# Patient Record
Sex: MALE | Race: WHITE | NOT HISPANIC OR LATINO | Employment: OTHER | URBAN - METROPOLITAN AREA
[De-identification: names, ages, dates, MRNs, and addresses within clinical notes are randomized per-mention and may not be internally consistent; named-entity substitution may affect disease eponyms.]

---

## 2017-07-19 ENCOUNTER — PREPPED CHART (OUTPATIENT)
Dept: URBAN - METROPOLITAN AREA CLINIC 39 | Facility: CLINIC | Age: 72
End: 2017-07-19

## 2018-04-05 ENCOUNTER — ESTABLISHED PATIENT (OUTPATIENT)
Dept: URBAN - METROPOLITAN AREA CLINIC 39 | Facility: CLINIC | Age: 73
End: 2018-04-05

## 2018-04-05 DIAGNOSIS — H43.811: ICD-10-CM

## 2018-04-05 DIAGNOSIS — H35.371: ICD-10-CM

## 2018-04-05 DIAGNOSIS — H04.123: ICD-10-CM

## 2018-04-05 DIAGNOSIS — H25.812: ICD-10-CM

## 2018-04-05 DIAGNOSIS — H25.811: ICD-10-CM

## 2018-04-05 DIAGNOSIS — H18.59: ICD-10-CM

## 2018-04-05 PROCEDURE — G8785 BP SCRN NO PERF AT INTERVAL: HCPCS

## 2018-04-05 PROCEDURE — 92014 COMPRE OPH EXAM EST PT 1/>: CPT

## 2018-04-05 PROCEDURE — G8427 DOCREV CUR MEDS BY ELIG CLIN: HCPCS

## 2018-04-05 PROCEDURE — 1036F TOBACCO NON-USER: CPT

## 2018-04-05 PROCEDURE — 92015 DETERMINE REFRACTIVE STATE: CPT

## 2018-04-05 ASSESSMENT — VISUAL ACUITY
OS_SC: J1
OS_CC: J1
OS_CC: 20/25
OD_CC: 20/25
OD_SC: J1
OD_CC: J1
OD_SC: 20/400
OS_SC: 20/200

## 2018-04-05 ASSESSMENT — TONOMETRY
OS_IOP_MMHG: 10
OD_IOP_MMHG: 10

## 2018-05-09 ENCOUNTER — CONTACT LENS FOLLOW UP (OUTPATIENT)
Dept: URBAN - METROPOLITAN AREA CLINIC 39 | Facility: CLINIC | Age: 73
End: 2018-05-09

## 2018-05-09 DIAGNOSIS — H52.203: ICD-10-CM

## 2018-05-09 DIAGNOSIS — H52.13: ICD-10-CM

## 2018-05-09 PROCEDURE — 92310F

## 2018-05-09 ASSESSMENT — VISUAL ACUITY
OD_SC: 20/25
OS_SC: 20/30

## 2019-04-30 ENCOUNTER — RETINA CONSULT (OUTPATIENT)
Dept: URBAN - METROPOLITAN AREA CLINIC 39 | Facility: CLINIC | Age: 74
End: 2019-04-30

## 2019-04-30 ENCOUNTER — ESTABLISHED COMPREHENSIVE EXAM (OUTPATIENT)
Dept: URBAN - METROPOLITAN AREA CLINIC 39 | Facility: CLINIC | Age: 74
End: 2019-04-30

## 2019-04-30 VITALS — DIASTOLIC BLOOD PRESSURE: 80 MMHG | HEART RATE: 64 BPM | SYSTOLIC BLOOD PRESSURE: 116 MMHG | HEIGHT: 60 IN

## 2019-04-30 DIAGNOSIS — H33.311: ICD-10-CM

## 2019-04-30 DIAGNOSIS — H43.11: ICD-10-CM

## 2019-04-30 DIAGNOSIS — H43.813: ICD-10-CM

## 2019-04-30 DIAGNOSIS — H35.371: ICD-10-CM

## 2019-04-30 DIAGNOSIS — H43.391: ICD-10-CM

## 2019-04-30 DIAGNOSIS — H43.822: ICD-10-CM

## 2019-04-30 DIAGNOSIS — H43.811: ICD-10-CM

## 2019-04-30 DIAGNOSIS — H25.811: ICD-10-CM

## 2019-04-30 DIAGNOSIS — H33.011: ICD-10-CM

## 2019-04-30 DIAGNOSIS — H52.13: ICD-10-CM

## 2019-04-30 DIAGNOSIS — H25.812: ICD-10-CM

## 2019-04-30 DIAGNOSIS — H04.123: ICD-10-CM

## 2019-04-30 DIAGNOSIS — H18.59: ICD-10-CM

## 2019-04-30 DIAGNOSIS — H52.203: ICD-10-CM

## 2019-04-30 PROCEDURE — 92134 CPTRZ OPH DX IMG PST SGM RTA: CPT

## 2019-04-30 PROCEDURE — 67105 REPAIR DETACHED RETINA PC: CPT

## 2019-04-30 PROCEDURE — 92015 DETERMINE REFRACTIVE STATE: CPT

## 2019-04-30 PROCEDURE — 99214 OFFICE O/P EST MOD 30 MIN: CPT

## 2019-04-30 PROCEDURE — 92014 COMPRE OPH EXAM EST PT 1/>: CPT

## 2019-04-30 PROCEDURE — 9222550 BILAT EXTENDED OPHTHALMOSCOPY, FIRST

## 2019-04-30 PROCEDURE — 92310-1 LEVEL 1 CONTACT LENS MANAGEMENT

## 2019-04-30 ASSESSMENT — VISUAL ACUITY
OD_SC: J1
OS_SC: J1
OS_CC: 20/40+2
OS_BAT: 20/70
OD_BAT: 20/70
OD_CC: 20/40+1
OD_CC: J1
OS_SC: 20/100-1
OD_CC: J1
OS_CC: J1
OD_SC: 20/200
OU_CC: 20/40+2
OS_CC: 20/40+2
OS_CC: J1
OD_CC: 20/40+1

## 2019-04-30 ASSESSMENT — TONOMETRY
OS_IOP_MMHG: 8
OD_IOP_MMHG: 8
OS_IOP_MMHG: 08
OD_IOP_MMHG: 08

## 2019-04-30 ASSESSMENT — KERATOMETRY
OD_K2POWER_DIOPTERS: 46
OS_K1POWER_DIOPTERS: 43.5
OD_AXISANGLE2_DEGREES: 82
OS_AXISANGLE2_DEGREES: 91
OS_AXISANGLE_DEGREES: 1
OS_K2POWER_DIOPTERS: 46.75
OD_K1POWER_DIOPTERS: 44
OD_AXISANGLE_DEGREES: 172

## 2019-05-09 ENCOUNTER — DILATED FUNDUS EXAM (OUTPATIENT)
Dept: URBAN - METROPOLITAN AREA CLINIC 39 | Facility: CLINIC | Age: 74
End: 2019-05-09

## 2019-05-09 DIAGNOSIS — H33.311: ICD-10-CM

## 2019-05-09 DIAGNOSIS — H33.011: ICD-10-CM

## 2019-05-09 PROCEDURE — 92012 INTRM OPH EXAM EST PATIENT: CPT

## 2019-05-09 ASSESSMENT — TONOMETRY
OS_IOP_MMHG: 08
OD_IOP_MMHG: 10

## 2019-05-09 ASSESSMENT — VISUAL ACUITY
OD_SC: 20/25-2
OS_SC: 20/25-1

## 2019-07-09 ENCOUNTER — DILATED FUNDUS EXAM (OUTPATIENT)
Dept: URBAN - METROPOLITAN AREA CLINIC 39 | Facility: CLINIC | Age: 74
End: 2019-07-09

## 2019-07-09 DIAGNOSIS — H43.391: ICD-10-CM

## 2019-07-09 DIAGNOSIS — H33.011: ICD-10-CM

## 2019-07-09 DIAGNOSIS — H43.811: ICD-10-CM

## 2019-07-09 DIAGNOSIS — H35.371: ICD-10-CM

## 2019-07-09 DIAGNOSIS — H35.363: ICD-10-CM

## 2019-07-09 DIAGNOSIS — H33.311: ICD-10-CM

## 2019-07-09 DIAGNOSIS — H43.822: ICD-10-CM

## 2019-07-09 PROCEDURE — 9222650 BILAT EXTENDED OPHTHALMOSCOPY, F/U

## 2019-07-09 PROCEDURE — 92134 CPTRZ OPH DX IMG PST SGM RTA: CPT

## 2019-07-09 PROCEDURE — 92012 INTRM OPH EXAM EST PATIENT: CPT

## 2019-07-09 ASSESSMENT — VISUAL ACUITY
OD_CC: 20/25-2
OS_CC: 20/25-2

## 2019-07-09 ASSESSMENT — TONOMETRY
OS_IOP_MMHG: 10
OD_IOP_MMHG: 11

## 2019-07-29 ENCOUNTER — CATARACT CONSULT (OUTPATIENT)
Dept: URBAN - METROPOLITAN AREA CLINIC 39 | Facility: CLINIC | Age: 74
End: 2019-07-29

## 2019-07-29 DIAGNOSIS — H43.391: ICD-10-CM

## 2019-07-29 DIAGNOSIS — H33.311: ICD-10-CM

## 2019-07-29 DIAGNOSIS — H25.811: ICD-10-CM

## 2019-07-29 DIAGNOSIS — H35.371: ICD-10-CM

## 2019-07-29 DIAGNOSIS — H25.812: ICD-10-CM

## 2019-07-29 DIAGNOSIS — H43.811: ICD-10-CM

## 2019-07-29 DIAGNOSIS — H33.011: ICD-10-CM

## 2019-07-29 DIAGNOSIS — H43.822: ICD-10-CM

## 2019-07-29 PROCEDURE — 92025-2 CORNEAL TOPOGRAPHY, PT

## 2019-07-29 PROCEDURE — 92134 CPTRZ OPH DX IMG PST SGM RTA: CPT

## 2019-07-29 PROCEDURE — 92136TC INTERFEROMETRY - TECHNICAL COMPONENT

## 2019-07-29 PROCEDURE — 92014 COMPRE OPH EXAM EST PT 1/>: CPT

## 2019-07-29 PROCEDURE — V2799PMN IMPRIMIS PRED-MOXI-NEPAF 5ML

## 2019-07-29 ASSESSMENT — VISUAL ACUITY
OD_SC: J1+
OD_CC: 20/30
OD_BAT: 20/60
OS_BAT: 20/50
OS_SC: 20/400
OS_SC: J2
OD_CC: J1
OD_RAM: 20/20-2
OS_CC: 20/30
OD_SC: 20/400
OS_CC: J1
OS_AM: 20/20

## 2019-07-29 ASSESSMENT — TONOMETRY
OS_IOP_MMHG: 09
OD_IOP_MMHG: 10

## 2019-09-23 ENCOUNTER — SURGERY/PROCEDURE (OUTPATIENT)
Dept: URBAN - METROPOLITAN AREA CLINIC 39 | Facility: CLINIC | Age: 74
End: 2019-09-23

## 2019-09-23 ENCOUNTER — PRE-OP/H&P (OUTPATIENT)
Dept: URBAN - METROPOLITAN AREA CLINIC 39 | Facility: CLINIC | Age: 74
End: 2019-09-23

## 2019-09-23 DIAGNOSIS — H35.371: ICD-10-CM

## 2019-09-23 DIAGNOSIS — H25.811: ICD-10-CM

## 2019-09-23 DIAGNOSIS — H33.011: ICD-10-CM

## 2019-09-23 DIAGNOSIS — H43.811: ICD-10-CM

## 2019-09-23 DIAGNOSIS — H25.812: ICD-10-CM

## 2019-09-23 DIAGNOSIS — H33.311: ICD-10-CM

## 2019-09-23 DIAGNOSIS — H43.391: ICD-10-CM

## 2019-09-23 PROCEDURE — 66984AV REMOVE CATARACT, INSERT ADVANCED LENS

## 2019-09-23 PROCEDURE — 99211T TECH SERVICE

## 2019-09-23 PROCEDURE — 66999LNSR LENSAR LASER FOR CAT SX

## 2019-09-24 ENCOUNTER — POST OP/EVAL OF SECOND EYE (OUTPATIENT)
Dept: URBAN - METROPOLITAN AREA CLINIC 39 | Facility: CLINIC | Age: 74
End: 2019-09-24

## 2019-09-24 DIAGNOSIS — Z96.1: ICD-10-CM

## 2019-09-24 DIAGNOSIS — H25.811: ICD-10-CM

## 2019-09-24 PROCEDURE — 92012 INTRM OPH EXAM EST PATIENT: CPT

## 2019-09-24 PROCEDURE — 99024 POSTOP FOLLOW-UP VISIT: CPT

## 2019-09-24 ASSESSMENT — VISUAL ACUITY
OS_SC: 20/25-2
OS_SC: J3
OD_BAT: 20/60

## 2019-09-24 ASSESSMENT — TONOMETRY
OS_IOP_MMHG: 11
OD_IOP_MMHG: 10

## 2019-09-30 ENCOUNTER — SURGERY/PROCEDURE (OUTPATIENT)
Dept: URBAN - METROPOLITAN AREA CLINIC 39 | Facility: CLINIC | Age: 74
End: 2019-09-30

## 2019-09-30 ENCOUNTER — PRE-OP/H&P (OUTPATIENT)
Dept: URBAN - METROPOLITAN AREA CLINIC 39 | Facility: CLINIC | Age: 74
End: 2019-09-30

## 2019-09-30 DIAGNOSIS — H26.492: ICD-10-CM

## 2019-09-30 DIAGNOSIS — H43.811: ICD-10-CM

## 2019-09-30 DIAGNOSIS — H35.371: ICD-10-CM

## 2019-09-30 DIAGNOSIS — H25.811: ICD-10-CM

## 2019-09-30 DIAGNOSIS — H43.391: ICD-10-CM

## 2019-09-30 PROCEDURE — 65772LRI LRI DURING CAT SX

## 2019-09-30 PROCEDURE — 99211T TECH SERVICE

## 2019-09-30 PROCEDURE — 66984AV REMOVE CATARACT, INSERT ADVANCED LENS

## 2019-09-30 PROCEDURE — 66999LNSR LENSAR LASER FOR CAT SX

## 2019-09-30 ASSESSMENT — VISUAL ACUITY
OD_SC: 20/400
OS_SC: 20/25-1
OS_SC: J4 @ 23"
OD_SC: J1

## 2019-09-30 ASSESSMENT — TONOMETRY
OS_IOP_MMHG: 09
OD_IOP_MMHG: 08

## 2019-10-01 ENCOUNTER — CATARACT POST-OP 1-DAY (OUTPATIENT)
Dept: URBAN - METROPOLITAN AREA CLINIC 39 | Facility: CLINIC | Age: 74
End: 2019-10-01

## 2019-10-01 DIAGNOSIS — Z96.1: ICD-10-CM

## 2019-10-01 PROCEDURE — 99024 POSTOP FOLLOW-UP VISIT: CPT

## 2019-10-01 ASSESSMENT — TONOMETRY
OD_IOP_MMHG: 15
OS_IOP_MMHG: 11

## 2019-10-01 ASSESSMENT — VISUAL ACUITY
OU_SC: 20/25+2
OU_SC: J2
OS_SC: 20/20-2
OS_SC: J3
OD_SC: 20/40
OD_SC: J5

## 2019-10-31 ENCOUNTER — POST-OP CATARACT (OUTPATIENT)
Dept: URBAN - METROPOLITAN AREA CLINIC 39 | Facility: CLINIC | Age: 74
End: 2019-10-31

## 2019-10-31 DIAGNOSIS — Z96.1: ICD-10-CM

## 2019-10-31 PROCEDURE — 99024 POSTOP FOLLOW-UP VISIT: CPT

## 2019-10-31 ASSESSMENT — VISUAL ACUITY
OU_SC: J2
OS_SC: J3
OD_BAT: 20/40 W/MR
OD_SC: 20/25-1
OU_SC: 20/20-2
OD_SC: J3
OS_SC: 20/20-2
OS_BAT: 20/25 W/MR

## 2019-10-31 ASSESSMENT — TONOMETRY
OD_IOP_MMHG: 13
OS_IOP_MMHG: 12

## 2019-12-05 ENCOUNTER — POST-OP CATARACT (OUTPATIENT)
Dept: URBAN - METROPOLITAN AREA CLINIC 39 | Facility: CLINIC | Age: 74
End: 2019-12-05

## 2019-12-05 DIAGNOSIS — H26.491: ICD-10-CM

## 2019-12-05 DIAGNOSIS — Z96.1: ICD-10-CM

## 2019-12-05 DIAGNOSIS — H26.492: ICD-10-CM

## 2019-12-05 PROCEDURE — 99024 POSTOP FOLLOW-UP VISIT: CPT

## 2019-12-05 ASSESSMENT — VISUAL ACUITY
OS_BAT: 20/25 W/MR
OD_SC: J2
OS_SC: J2
OU_SC: J1
OS_SC: 20/20-1
OU_SC: 20/20-1
OD_SC: 20/25
OD_BAT: <20/400 W/MR

## 2019-12-05 ASSESSMENT — TONOMETRY
OD_IOP_MMHG: 11
OS_IOP_MMHG: 10

## 2020-01-10 ENCOUNTER — ESTABLISHED PATIENT (OUTPATIENT)
Dept: URBAN - METROPOLITAN AREA CLINIC 39 | Facility: CLINIC | Age: 75
End: 2020-01-10

## 2020-01-10 ENCOUNTER — SURGERY/PROCEDURE (OUTPATIENT)
Dept: URBAN - METROPOLITAN AREA SURGERY 14 | Facility: SURGERY | Age: 75
End: 2020-01-10

## 2020-01-10 DIAGNOSIS — H35.363: ICD-10-CM

## 2020-01-10 DIAGNOSIS — H35.371: ICD-10-CM

## 2020-01-10 DIAGNOSIS — H26.492: ICD-10-CM

## 2020-01-10 PROCEDURE — 92012 INTRM OPH EXAM EST PATIENT: CPT

## 2020-01-10 PROCEDURE — 66821 AFTER CATARACT LASER SURGERY: CPT

## 2020-01-10 PROCEDURE — 92134 CPTRZ OPH DX IMG PST SGM RTA: CPT

## 2020-01-10 ASSESSMENT — VISUAL ACUITY
OD_SC: 20/30-1
OS_BAT: 20/60
OD_BAT: 20/60
OD_SC: J2-
OS_SC: 20/25+2
OS_SC: J2

## 2020-01-10 ASSESSMENT — TONOMETRY
OS_IOP_MMHG: 10
OD_IOP_MMHG: 10

## 2020-01-17 ENCOUNTER — YAG POST-OP (OUTPATIENT)
Dept: URBAN - METROPOLITAN AREA CLINIC 39 | Facility: CLINIC | Age: 75
End: 2020-01-17

## 2020-01-17 DIAGNOSIS — Z98.890: ICD-10-CM

## 2020-01-17 PROCEDURE — 99024 POSTOP FOLLOW-UP VISIT: CPT

## 2020-01-17 ASSESSMENT — TONOMETRY
OS_IOP_MMHG: 08
OD_IOP_MMHG: 07

## 2020-01-17 ASSESSMENT — VISUAL ACUITY
OD_SC: 20/20-1
OS_SC: J1-
OD_SC: J2-
OS_SC: 20/20

## 2020-01-22 ENCOUNTER — SURGERY/PROCEDURE (OUTPATIENT)
Dept: URBAN - METROPOLITAN AREA CLINIC 39 | Facility: CLINIC | Age: 75
End: 2020-01-22

## 2020-01-22 ENCOUNTER — PRE-OP/H&P (OUTPATIENT)
Dept: URBAN - METROPOLITAN AREA CLINIC 39 | Facility: CLINIC | Age: 75
End: 2020-01-22

## 2020-01-22 DIAGNOSIS — T85.29XA: ICD-10-CM

## 2020-01-22 PROCEDURE — 99211T TECH SERVICE

## 2020-01-22 PROCEDURE — 66986 EXCHANGE LENS PROSTHESIS: CPT

## 2020-01-23 ENCOUNTER — CATARACT POST-OP 1-DAY (OUTPATIENT)
Dept: URBAN - METROPOLITAN AREA CLINIC 39 | Facility: CLINIC | Age: 75
End: 2020-01-23

## 2020-01-23 DIAGNOSIS — Z98.890: ICD-10-CM

## 2020-01-23 PROCEDURE — 99024 POSTOP FOLLOW-UP VISIT: CPT

## 2020-01-23 ASSESSMENT — VISUAL ACUITY
OD_SC: J10
OD_PH: 20/40-1
OD_SC: 20/70

## 2020-01-23 ASSESSMENT — TONOMETRY
OD_IOP_MMHG: 09
OS_IOP_MMHG: 09

## 2020-02-06 ENCOUNTER — EST. PATIENT EMERGENCY (OUTPATIENT)
Dept: URBAN - METROPOLITAN AREA CLINIC 39 | Facility: CLINIC | Age: 75
End: 2020-02-06

## 2020-02-06 DIAGNOSIS — Z98.890: ICD-10-CM

## 2020-02-06 DIAGNOSIS — H26.491: ICD-10-CM

## 2020-02-06 PROCEDURE — 99024 POSTOP FOLLOW-UP VISIT: CPT

## 2020-02-06 ASSESSMENT — VISUAL ACUITY
OS_SC: 20/20
OD_SC: J8
OD_SC: 20/30+2
OS_SC: J1
OD_BAT: <20/400 W/ MR

## 2020-02-06 ASSESSMENT — TONOMETRY
OS_IOP_MMHG: 08
OD_IOP_MMHG: 08

## 2020-02-18 ENCOUNTER — POST-OP CATARACT (OUTPATIENT)
Dept: URBAN - METROPOLITAN AREA CLINIC 39 | Facility: CLINIC | Age: 75
End: 2020-02-18

## 2020-02-18 DIAGNOSIS — H26.491: ICD-10-CM

## 2020-02-18 DIAGNOSIS — Z98.890: ICD-10-CM

## 2020-02-18 PROCEDURE — 99024 POSTOP FOLLOW-UP VISIT: CPT

## 2020-02-18 ASSESSMENT — VISUAL ACUITY
OU_SC: J2
OS_SC: J2
OD_CC: J1+
OD_SC: J3
OU_SC: 20/20
OS_SC: 20/20
OD_BAT: 20/70-2
OD_SC: 20/30
OS_CC: J1+

## 2020-02-18 ASSESSMENT — TONOMETRY
OD_IOP_MMHG: 10
OS_IOP_MMHG: 10

## 2020-02-25 ENCOUNTER — SURGERY/PROCEDURE (OUTPATIENT)
Dept: URBAN - METROPOLITAN AREA SURGERY 14 | Facility: SURGERY | Age: 75
End: 2020-02-25

## 2020-02-25 ENCOUNTER — YAG EVALUATION (OUTPATIENT)
Dept: URBAN - METROPOLITAN AREA CLINIC 39 | Facility: CLINIC | Age: 75
End: 2020-02-25

## 2020-02-25 DIAGNOSIS — H26.491: ICD-10-CM

## 2020-02-25 PROCEDURE — 92014 COMPRE OPH EXAM EST PT 1/>: CPT

## 2020-02-25 PROCEDURE — 66821 AFTER CATARACT LASER SURGERY: CPT

## 2020-02-25 ASSESSMENT — VISUAL ACUITY
OD_SC: 20/25-2
OS_SC: 20/20
OD_BAT: 20/70-2

## 2020-02-25 ASSESSMENT — TONOMETRY
OS_IOP_MMHG: 10
OD_IOP_MMHG: 10

## 2020-03-05 ENCOUNTER — YAG POST-OP (OUTPATIENT)
Dept: URBAN - METROPOLITAN AREA CLINIC 39 | Facility: CLINIC | Age: 75
End: 2020-03-05

## 2020-03-05 DIAGNOSIS — Z96.1: ICD-10-CM

## 2020-03-05 PROCEDURE — 99024 POSTOP FOLLOW-UP VISIT: CPT

## 2020-03-05 ASSESSMENT — VISUAL ACUITY
OS_SC: J1-
OD_SC: J3-
OS_CC: J1+
OD_CC: J1+
OS_SC: 20/20-1
OD_SC: 20/25+1

## 2020-03-05 ASSESSMENT — TONOMETRY
OD_IOP_MMHG: 07
OS_IOP_MMHG: 07

## 2020-03-16 ENCOUNTER — YAG POST-OP (OUTPATIENT)
Dept: URBAN - METROPOLITAN AREA CLINIC 39 | Facility: CLINIC | Age: 75
End: 2020-03-16

## 2020-03-16 DIAGNOSIS — Z98.890: ICD-10-CM

## 2020-03-16 DIAGNOSIS — H35.371: ICD-10-CM

## 2020-03-16 DIAGNOSIS — H35.363: ICD-10-CM

## 2020-03-16 PROCEDURE — 99024 POSTOP FOLLOW-UP VISIT: CPT

## 2020-03-16 PROCEDURE — 92134 CPTRZ OPH DX IMG PST SGM RTA: CPT

## 2020-03-16 ASSESSMENT — VISUAL ACUITY
OS_SC: J1-
OD_SC: 20/25+2
OD_SC: J3-
OS_SC: 20/20

## 2020-03-16 ASSESSMENT — TONOMETRY
OS_IOP_MMHG: 10
OD_IOP_MMHG: 10

## 2020-03-17 ENCOUNTER — POST-OP (OUTPATIENT)
Dept: URBAN - METROPOLITAN AREA CLINIC 39 | Facility: CLINIC | Age: 75
End: 2020-03-17

## 2020-03-17 DIAGNOSIS — H04.123: ICD-10-CM

## 2020-03-17 DIAGNOSIS — Z98.890: ICD-10-CM

## 2020-03-17 PROCEDURE — 68761Q PUNCTAL PLUG/QUINTESS DISSOLVABLE PLUG/EACH

## 2020-03-17 PROCEDURE — 99024 POSTOP FOLLOW-UP VISIT: CPT

## 2020-03-17 PROCEDURE — A4262 TEMPORARY TEAR DUCT PLUG: HCPCS

## 2020-03-17 ASSESSMENT — VISUAL ACUITY
OS_SC: 20/20
OD_SC: 20/25-1

## 2020-03-25 ENCOUNTER — POST-OP (OUTPATIENT)
Dept: URBAN - METROPOLITAN AREA CLINIC 39 | Facility: CLINIC | Age: 75
End: 2020-03-25

## 2020-03-25 DIAGNOSIS — H04.123: ICD-10-CM

## 2020-03-25 DIAGNOSIS — H35.371: ICD-10-CM

## 2020-03-25 DIAGNOSIS — H52.7: ICD-10-CM

## 2020-03-25 PROCEDURE — 99024 POSTOP FOLLOW-UP VISIT: CPT

## 2020-03-25 ASSESSMENT — VISUAL ACUITY
OS_SC: J2
OU_SC: J1
OU_SC: 20/20
OD_SC: J2
OS_SC: 20/20
OD_SC: 20/25+1

## 2020-03-25 ASSESSMENT — TONOMETRY
OD_IOP_MMHG: 11
OS_IOP_MMHG: 10

## 2020-06-10 ENCOUNTER — ESTABLISHED COMPREHENSIVE EXAM (OUTPATIENT)
Dept: URBAN - METROPOLITAN AREA CLINIC 39 | Facility: CLINIC | Age: 75
End: 2020-06-10

## 2020-06-10 DIAGNOSIS — H18.59: ICD-10-CM

## 2020-06-10 DIAGNOSIS — H52.13: ICD-10-CM

## 2020-06-10 DIAGNOSIS — H43.822: ICD-10-CM

## 2020-06-10 DIAGNOSIS — H43.811: ICD-10-CM

## 2020-06-10 DIAGNOSIS — H43.391: ICD-10-CM

## 2020-06-10 DIAGNOSIS — H35.371: ICD-10-CM

## 2020-06-10 DIAGNOSIS — H35.363: ICD-10-CM

## 2020-06-10 DIAGNOSIS — H52.7: ICD-10-CM

## 2020-06-10 DIAGNOSIS — H52.223: ICD-10-CM

## 2020-06-10 DIAGNOSIS — H04.123: ICD-10-CM

## 2020-06-10 PROCEDURE — 92014 COMPRE OPH EXAM EST PT 1/>: CPT

## 2020-06-10 PROCEDURE — 92015 DETERMINE REFRACTIVE STATE: CPT

## 2020-06-10 ASSESSMENT — VISUAL ACUITY
OD_SC: 20/25-1
OD_SC: J3
OD_CC: J1+
OS_CC: J1+
OS_SC: J3
OU_SC: 20/20-1
OS_SC: 20/20-1

## 2020-06-10 ASSESSMENT — TONOMETRY
OS_IOP_MMHG: 11
OD_IOP_MMHG: 12

## 2020-06-17 ENCOUNTER — PRE-OP/H&P (OUTPATIENT)
Dept: URBAN - METROPOLITAN AREA CLINIC 39 | Facility: CLINIC | Age: 75
End: 2020-06-17

## 2020-06-17 ENCOUNTER — SURGERY/PROCEDURE (OUTPATIENT)
Dept: URBAN - METROPOLITAN AREA CLINIC 39 | Facility: CLINIC | Age: 75
End: 2020-06-17

## 2020-06-17 DIAGNOSIS — H43.811: ICD-10-CM

## 2020-06-17 DIAGNOSIS — H04.123: ICD-10-CM

## 2020-06-17 DIAGNOSIS — H35.363: ICD-10-CM

## 2020-06-17 DIAGNOSIS — H18.59: ICD-10-CM

## 2020-06-17 DIAGNOSIS — H35.371: ICD-10-CM

## 2020-06-17 DIAGNOSIS — H43.391: ICD-10-CM

## 2020-06-17 DIAGNOSIS — H52.7: ICD-10-CM

## 2020-06-17 DIAGNOSIS — H43.822: ICD-10-CM

## 2020-06-17 PROCEDURE — 99211T TECH SERVICE

## 2020-06-17 PROCEDURE — 66999LNSR LENSAR LASER FOR CAT SX

## 2020-06-17 PROCEDURE — 65772 CORRECTION OF ASTIGMATISM: CPT

## 2020-06-18 ENCOUNTER — CATARACT POST-OP 1-DAY (OUTPATIENT)
Dept: URBAN - METROPOLITAN AREA CLINIC 39 | Facility: CLINIC | Age: 75
End: 2020-06-18

## 2020-06-18 DIAGNOSIS — Z98.890: ICD-10-CM

## 2020-06-18 PROCEDURE — 99024 POSTOP FOLLOW-UP VISIT: CPT

## 2020-06-18 ASSESSMENT — TONOMETRY
OD_IOP_MMHG: 10
OS_IOP_MMHG: 10

## 2020-06-18 ASSESSMENT — VISUAL ACUITY
OD_SC: J3
OD_SC: 20/20-2

## 2020-06-25 NOTE — PATIENT DISCUSSION
Dry Eyes OU:  Increase PF artificials tears. Encouraged regular use. PF Retain MGD q1h wa ou. Pt taking meds that are severely drying. Need to get tear film better if wants to proceed with surgery.

## 2020-06-25 NOTE — PATIENT DISCUSSION
1.  Cataract OD: Discussed the risks benefits alternatives and limitations of cataract surgery including infection bleeding loss of vision retinal tears detachment. The patient stated a full understanding and a desire to proceed with the procedure in the right eye. Refractive options were reviewed. Patient has elected to be optimized for near vision in the right eye. The patient will need glasses for distance. Schedule KPE/IOL OD. Pore -2.252. Pseudophakia OS - IOL stable. Monitor for changes in vision. 3.  Dry Eyes OU:  Increase PF artificials tears. Encouraged regular use. PF Retain MGD q1h wa ou. Pt taking meds that are severely drying. Need to get tear film better if wants to proceed with surgery. 4. Return for an appointment in 2 weeks for office call. with Dr. Kendy Roy.

## 2020-07-09 ENCOUNTER — POST-OP (OUTPATIENT)
Dept: URBAN - METROPOLITAN AREA CLINIC 39 | Facility: CLINIC | Age: 75
End: 2020-07-09

## 2020-07-09 DIAGNOSIS — Z98.890: ICD-10-CM

## 2020-07-09 PROCEDURE — 99024 POSTOP FOLLOW-UP VISIT: CPT

## 2020-07-09 ASSESSMENT — TONOMETRY
OD_IOP_MMHG: 6
OS_IOP_MMHG: 6

## 2020-07-09 ASSESSMENT — VISUAL ACUITY
OD_SC: J2
OD_SC: 20/30+2

## 2020-10-15 NOTE — PATIENT DISCUSSION
1.  Dry Eye OU: Much improved but still symptomatic Continue current management with Artificial Tears. Discussed option of TPP risks and benefits reviewed. Plan TPP today2. Combined Types of Cataract OD: Discussed the risks benefits alternatives and limitations of cataract surgery including infection bleeding loss of vision retinal tears detachment. The patient stated a full understanding and a desire to near  vision . Schedule KPE/IOL 00 PORE. -2.00. Not a candidate for MFL due to dryness ou.3. Informed consent obtainedCollagen Plug placed in bilateral lower lids without complication. .3mm extended duration collagen plugs RLL and LLL. 4. Pseudophakia OS - IOL stable. Monitor for changes in vision. 5. Return for an appointment for [address] [place] [address] with Dr. Reilly Poole.

## 2020-10-15 NOTE — PATIENT DISCUSSION
Informed consent obtainedCollagen Plug placed in bilateral lower lids without complication. .3mm extended duration collagen plugs RLL and LLL.

## 2020-10-15 NOTE — PATIENT DISCUSSION
Combined Types of Cataract OD: Discussed the risks benefits alternatives and limitations of cataract surgery including infection bleeding loss of vision retinal tears detachment. The patient stated a full understanding and a desire to near  vision . Schedule KPE/IOL 00 PORE. -2.00. Not a candidate for MFL due to dryness ou.

## 2021-01-12 ENCOUNTER — ESTABLISHED COMPREHENSIVE EXAM (OUTPATIENT)
Dept: URBAN - METROPOLITAN AREA CLINIC 39 | Facility: CLINIC | Age: 76
End: 2021-01-12

## 2021-01-12 DIAGNOSIS — H43.391: ICD-10-CM

## 2021-01-12 DIAGNOSIS — H52.13: ICD-10-CM

## 2021-01-12 DIAGNOSIS — H43.811: ICD-10-CM

## 2021-01-12 DIAGNOSIS — H04.123: ICD-10-CM

## 2021-01-12 DIAGNOSIS — H35.371: ICD-10-CM

## 2021-01-12 DIAGNOSIS — Z96.1: ICD-10-CM

## 2021-01-12 DIAGNOSIS — H52.223: ICD-10-CM

## 2021-01-12 DIAGNOSIS — H43.822: ICD-10-CM

## 2021-01-12 DIAGNOSIS — H18.593: ICD-10-CM

## 2021-01-12 DIAGNOSIS — H35.363: ICD-10-CM

## 2021-01-12 PROCEDURE — 92014 COMPRE OPH EXAM EST PT 1/>: CPT

## 2021-01-12 PROCEDURE — 92499OP2 OPTOMAP RETINAL SCREENING BOTH EYES

## 2021-01-12 PROCEDURE — 92015 DETERMINE REFRACTIVE STATE: CPT

## 2021-01-12 ASSESSMENT — VISUAL ACUITY
OD_SC: J3
OS_CC: J1+
OD_CC: J1+
OU_SC: J2
OS_SC: J2
OS_SC: 20/20
OD_SC: 20/20-2

## 2021-01-12 ASSESSMENT — KERATOMETRY
OD_K2POWER_DIOPTERS: 45.25
OD_AXISANGLE_DEGREES: 178
OS_AXISANGLE2_DEGREES: 93
OS_K2POWER_DIOPTERS: 47.00
OS_AXISANGLE_DEGREES: 3
OS_K1POWER_DIOPTERS: 43.50
OD_AXISANGLE2_DEGREES: 88
OD_K1POWER_DIOPTERS: 44.50

## 2021-01-12 ASSESSMENT — TONOMETRY
OD_IOP_MMHG: 8
OS_IOP_MMHG: 7

## 2021-01-21 NOTE — PATIENT DISCUSSION
1.  DRy eye better with TPP now gone. Option of PPP discussed. Risks and benefits of punctal plugs reviewed with patient. Recommend permantnt punctal plugs and continued topical therapy2. Informed consent obtainedPermanent Plug placed in bilateral lower lids without complication3. Pseudophakia OS - IOL stable. Monitor for changes in vision. 4. Combined Types of Cataract OD: Discussed the risks benefits alternatives and limitations of cataract surgery including infection bleeding loss of vision retinal tears detachment. Pt defers for now reevaluate next visitReturn for an appointment in 6 months for cataract evaluation. with Dr. Maria Ines Johns.

## 2021-01-21 NOTE — PATIENT DISCUSSION
Combined Types of Cataract OD: Discussed the risks benefits alternatives and limitations of cataract surgery including infection bleeding loss of vision retinal tears detachment. Pt defers for now reevaluate next visitReturn for an appointment in 6 months for cataract evaluation. with Dr. Nani Rodriguez.

## 2021-06-09 ENCOUNTER — ESTABLISHED COMPREHENSIVE EXAM (OUTPATIENT)
Dept: URBAN - METROPOLITAN AREA CLINIC 39 | Facility: CLINIC | Age: 76
End: 2021-06-09

## 2021-06-09 DIAGNOSIS — H52.223: ICD-10-CM

## 2021-06-09 DIAGNOSIS — H43.391: ICD-10-CM

## 2021-06-09 DIAGNOSIS — H35.371: ICD-10-CM

## 2021-06-09 DIAGNOSIS — H52.13: ICD-10-CM

## 2021-06-09 DIAGNOSIS — H43.822: ICD-10-CM

## 2021-06-09 DIAGNOSIS — H43.811: ICD-10-CM

## 2021-06-09 DIAGNOSIS — H35.363: ICD-10-CM

## 2021-06-09 DIAGNOSIS — H04.123: ICD-10-CM

## 2021-06-09 DIAGNOSIS — H18.593: ICD-10-CM

## 2021-06-09 PROCEDURE — 92499OP2 OPTOMAP RETINAL SCREENING BOTH EYES

## 2021-06-09 PROCEDURE — 92015 DETERMINE REFRACTIVE STATE: CPT

## 2021-06-09 PROCEDURE — 92014 COMPRE OPH EXAM EST PT 1/>: CPT

## 2021-06-09 ASSESSMENT — KERATOMETRY
OD_AXISANGLE_DEGREES: 177
OD_K2POWER_DIOPTERS: 45.50
OD_K1POWER_DIOPTERS: 44.75
OS_AXISANGLE2_DEGREES: 91
OS_AXISANGLE_DEGREES: 1
OS_K1POWER_DIOPTERS: 43.75
OD_AXISANGLE2_DEGREES: 87
OS_K2POWER_DIOPTERS: 47.50

## 2021-06-09 ASSESSMENT — VISUAL ACUITY
OD_SC: 20/20-2
OU_SC: J2
OS_SC: J2
OU_SC: 20/20
OS_SC: 20/20
OD_SC: J3

## 2021-06-09 ASSESSMENT — TONOMETRY
OS_IOP_MMHG: 7
OD_IOP_MMHG: 8

## 2021-06-22 NOTE — PATIENT DISCUSSION
Post-Op Day #1 - Cataract Surgery Right Eye (OD) - doing well. Tears prn. Continue postop drops as directed. Call office with symptoms of pain redness or decreased vision in operative eye. 1 drop tobramycin instilled. Patient to come back in 1 week PO/MRX with Dr Laura Gonzales

## 2021-06-28 NOTE — PATIENT DISCUSSION
1.  DRyness OU--severe--no cls--start working on tears PF q3h and giovana qhs.  2.  RTN 3 weeks PO--FU on dryness before fitting with cls.

## 2021-07-21 NOTE — PATIENT DISCUSSION
1.  Good fit with [employer]cker Ax so can order. Can order [employer] as well. 2.   Return for an appointment in 3 months for cataract evaluation with Dr. Iona Vieira

## 2022-03-01 NOTE — PATIENT DISCUSSION
1.  Corneal abrasion OS (vs neurotrophic ulcer?) - 8.8 AV Oasys plano BCL placed without incident. 8.4 was too tight. Besivance instilled first. To not wear any other CLs until healed. Start Polytrim QID OS. If poor healing consider Oxervate after checking corneal sensitivity at next visit. 2. Keratoconjunctivitis Sicca OU:  Continue current management. 3.  Pseudophakia OU - IOLs stable. Monitor for changes in vision. 4. Return for an appointment in 3 days for office call. with Dr. Serge Mckee.

## 2022-03-04 NOTE — PATIENT DISCUSSION
1.  Corneal abrasion OS - Improved significantly. Trace defect and slight discomfort when BCL removed. Can stop Polytrim gtt OS. New 8.8 Oasys plano BCL replaced to wear X 3 D then can remove. 2. Keratoconjunctivitis Sicca OU:  Continue current management. 3.  Pseudophakia OU - IOLs stable. Monitor for changes in vision.

## 2022-03-11 NOTE — PATIENT DISCUSSION
1.  Corneal abrasion OS - Healed2. Keratoconjunctivitis Sicca OU: Advised may not be able to continue CLs because of dry eyes. Will order trial Oasys torics to see if can remove easier than present Ultra torics that she struggles to remove. Has narrow aperture. Understands that colored CLs will not give as sharp vision and may feel drier. To limit wear to no more than 2 hours and to use rewetting gtt. Start RegenerEyes Lite TID OU and will recheck in 6 weeks at which time will also try the AV Oasys torics. 3. Pseudophakia OU - IOLs stable. Monitor for changes in vision.

## 2022-03-22 NOTE — PATIENT DISCUSSION
Good fit VA comfort with new Oasys CLs. Pt also happy that much easier to insert and remove. Dispense as DW X 2W. Can order if happy. Continue with RegenerEyes Lite and follow up in 6 weeks.

## 2022-04-25 NOTE — PATIENT DISCUSSION
3 month collagen plugs inserted today without incident. Can try decreasing RegenerEyes to BID. Monitor.

## 2022-04-25 NOTE — PROCEDURE NOTE: CLINICAL
PROCEDURE NOTE: Punctal Plugs, Collagen  OU. Diagnosis: Keratoconjunctivitis Sicca, Not Specified As Sjögren's. Prior to treatment, the risks/benefits/alternatives were discussed. The patient wished to proceed with procedure. One drop of proparacaine was placed and the a drop of lidocaine gel was placed over the puncta. Puncta was dilated with punctal dilator. Placed a collagen plug. Size/location of plugs inserted:  R&LLL, Oasis 0.4 mm punctal plugs, lot [id number]. Patient tolerated procedure well. There were no complications. Post-op instructions given. Tiffany Bridges

## 2022-05-17 NOTE — PATIENT DISCUSSION
Recommend against wearing contacts because eyes getting too dry. Patient can wear [employer] very rarely and only for less than four hours. To call if any problems.

## 2022-06-14 ENCOUNTER — COMPREHENSIVE EXAM (OUTPATIENT)
Dept: URBAN - METROPOLITAN AREA CLINIC 39 | Facility: CLINIC | Age: 77
End: 2022-06-14

## 2022-06-14 DIAGNOSIS — H35.371: ICD-10-CM

## 2022-06-14 DIAGNOSIS — H43.811: ICD-10-CM

## 2022-06-14 DIAGNOSIS — H35.363: ICD-10-CM

## 2022-06-14 DIAGNOSIS — H43.391: ICD-10-CM

## 2022-06-14 DIAGNOSIS — H43.822: ICD-10-CM

## 2022-06-14 DIAGNOSIS — H52.13: ICD-10-CM

## 2022-06-14 DIAGNOSIS — H04.123: ICD-10-CM

## 2022-06-14 DIAGNOSIS — H52.223: ICD-10-CM

## 2022-06-14 DIAGNOSIS — H18.593: ICD-10-CM

## 2022-06-14 PROCEDURE — 92015 DETERMINE REFRACTIVE STATE: CPT

## 2022-06-14 PROCEDURE — 92014 COMPRE OPH EXAM EST PT 1/>: CPT

## 2022-06-14 PROCEDURE — 92499OP2 OPTOMAP RETINAL SCREENING BOTH EYES

## 2022-06-14 ASSESSMENT — TONOMETRY
OS_IOP_MMHG: 10
OD_IOP_MMHG: 09

## 2022-06-14 ASSESSMENT — KERATOMETRY
OD_AXISANGLE_DEGREES: 177
OS_AXISANGLE2_DEGREES: 91
OS_K2POWER_DIOPTERS: 47.50
OD_K2POWER_DIOPTERS: 45.50
OS_K1POWER_DIOPTERS: 43.75
OD_K1POWER_DIOPTERS: 44.75
OS_AXISANGLE_DEGREES: 1
OD_AXISANGLE2_DEGREES: 87

## 2022-06-14 ASSESSMENT — VISUAL ACUITY
OS_CC: J1+
OS_SC: 20/20-1
OU_SC: J2
OS_SC: J3
OD_CC: J1+
OU_CC: J1+
OD_SC: J3
OU_SC: 20/20-1
OD_SC: 20/20-1

## 2022-06-29 NOTE — PATIENT DISCUSSION
Resume RegenerEyes TID. Was doing well but needs a refills. Also continue Systane in between.  Monitor.

## 2022-07-14 NOTE — PATIENT DISCUSSION
Continue RegenerEyes TID. Was doing well but needs a refills. Also continue ATs in between but try Refresh Relieva instead of Systane to see if any improvement. Also start PRN De3 Omega 3s. Advised will take 2-3 months to possibly appreciate any improvement. Can continue Retaine MGD BID. Monitor.

## 2022-08-25 NOTE — PATIENT DISCUSSION
Doing much better both with symptoms, SPK and tear film. Continue RegenerEyes TID but after 2 weeks if still feeling good, try to decrease to BID. Also continue ATs and PRN De3 [place] 3s. Monitor.

## 2022-10-30 NOTE — PATIENT DISCUSSION
Resume RegenerEyes TID. Was doing well but needs a refills. Also continue Systane in between.  Monitor. [facility]  General Surgery  Progress Note    Subjective:     History of Present Illness:  58 year old female, with pertinent medical history of anemia, kidney cancer, CHF with EF of 35%,, HTN, Pafib on OAC,prediabetes, and myocardiopathy, presents to the ED to evaluate her worsening bilateral leg pain and weakness for 5 days. Patient's  states she was seen for the same symptoms 5 days ago as well as sore throat, chest pain, and nausea. She had labwork done and her results were all negative. Sore throat, chest pain, and nausea have resolved. Patient woke up this morning very weak and fatigued. She was late for dialysis and when she arrived, she was advised to come to the ED. Patient received her dialysis 2 days ago as normal. Patient was able to ambulate alone yesterday with pain, but needs support today.per ER record,  reports she has decreased appetite and is no longer producing urine. No pain medications taken today.  notes that a week ago, patient attended an event and possibly had sick contacts.       Post-Op Info:  * No surgery found *         No current facility-administered medications on file prior to encounter.     Current Outpatient Medications on File Prior to Encounter   Medication Sig    acetaminophen (TYLENOL) 500 MG tablet Take 500 mg by mouth every 6 (six) hours as needed for Pain.    apixaban (ELIQUIS) 2.5 mg Tab Take 1 tablet (2.5 mg total) by mouth 2 (two) times daily.    CABOMETYX 60 mg Tab TAKE ONE TABLET BY MOUTH ONCE DAILY AT THE SAME TIME ON AN EMPTY STOMACH AT LEAST 1 HOUR BEFORE OR 2 HOURS AFTER EATING. AVOID GRAPEFRUIT PRODUCTS    cloNIDine 0.3 mg/24 hr td ptwk (CATAPRES) 0.3 mg/24 hr Place 1 patch onto the skin every 7 days.    epoetin david-epbx (RETACRIT INJ) Epoetin david - epbx (Retacrit)    hydrALAZINE (APRESOLINE) 100 MG tablet Take 1 tablet (100 mg total) by mouth every 12 (twelve) hours.    lidocaine-prilocaine (EMLA) cream APPLY ATLEAST  30 MINUTES BEFORE TREATMENT 3 TIMES A WEEK    metoprolol succinate (TOPROL-XL) 100 MG 24 hr tablet Take 1 tablet (100 mg total) by mouth once daily.    mv,Ca,min-folic acid-vit K1 (ONE-A-DAY WOMEN'S 50 PLUS) 400-20 mcg Tab Take 1 tablet by mouth.    ondansetron (ZOFRAN-ODT) 8 MG TbDL Take 1 tablet (8 mg total) by mouth every 12 (twelve) hours as needed.    oxyCODONE-acetaminophen (PERCOCET) 5-325 mg per tablet Take 1 tablet by mouth every 6 (six) hours as needed for Pain.    oxyCODONE-acetaminophen (PERCOCET) 5-325 mg per tablet Take 1 tablet by mouth every 4 (four) hours as needed.    [] predniSONE (DELTASONE) 20 MG tablet Take 2 tablets (40 mg total) by mouth once daily. for 4 days    promethazine (PHENERGAN) 25 MG tablet Take 1 tablet (25 mg total) by mouth every 6 (six) hours as needed for Nausea.    sacubitriL-valsartan (ENTRESTO) 49-51 mg per tablet Take 1 tablet by mouth 2 (two) times daily.    sevelamer carbonate (RENVELA) 800 mg Tab TAKE 2 TABLETS (1,600 MG TOTAL) BY MOUTH 3 (THREE) TIMES DAILY WITH MEALS. (Patient not taking: Reported on 10/26/2022)    sodium chloride 0.9% SolP 100 mL with iron sucrose 100 mg iron/5 mL Soln 100 mg 50 mg.    [DISCONTINUED] amLODIPine (NORVASC) 5 MG tablet TAKE 1 TABLET BY MOUTH EVERY DAY       Review of patient's allergies indicates:   Allergen Reactions    Coreg [carvedilol] Other (See Comments)     Nausea/vomiting    Allopurinol      Other reaction(s): abnormal transaminases       Past Medical History:   Diagnosis Date    Anemia     Bronchitis 2017    Cancer 2016    kidney cancer    CHF (congestive heart failure), NYHA class II, chronic, systolic     CMV (cytomegalovirus) antibody positive     Essential hypertension 2015    H/O herpes simplex type 2 infection     Herpes simplex type 1 antibody positive     History of kidney cancer     s/p left nephrectomy 2016    Hyperparathyroidism, unspecified     Hyperuricemia without signs  of inflammatory arthritis and tophaceous disease     Kidney stones     LGSIL (low grade squamous intraepithelial dysplasia)     Myocardiopathy 7/21/2017    Prediabetes     Proteinuria     Renal disorder     Thyroid nodule     Urate nephropathy      Past Surgical History:   Procedure Laterality Date    BREAST CYST EXCISION      COLONOSCOPY N/A 11/12/2015    COLONOSCOPY N/A 3/12/2021    Procedure: COLONOSCOPY;  Surgeon: Brendon Lanier MD;  Location: [facility];  Service: Endoscopy;  Laterality: N/A;  covid test 3/9, labs prior, prep instr mailed -ml    INSERTION OF BIVENTRICULAR IMPLANTABLE CARDIOVERTER-DEFIBRILLATOR (ICD)  04/2021    NEPHRECTOMY-LAPAROSCOPIC Left 01/12/2016    PERITONEAL CATHETER INSERTION      Permacath insertion  01/12/2017    SALPINGOOPHORECTOMY Right 2016    KJB---DAVINCI    TONSILLECTOMY      TUBAL LIGATION       Family History       Problem Relation (Age of Onset)    Diabetes Father, Maternal Grandfather    Heart disease Sister    Hypertension Mother    Kidney disease Father    No Known Problems Son, Son, Sister, Brother, Maternal Grandmother, Paternal Grandmother, Paternal Grandfather, Sister, Brother, Maternal Aunt, Maternal Uncle, Paternal Aunt, Paternal Uncle          Tobacco Use    Smoking status: Never    Smokeless tobacco: Never   Substance and Sexual Activity    Alcohol use: No     Comment: . 2 children. works at [employer].    Drug use: No    Sexual activity: Yes     Partners: Male     Review of Systems   Constitutional:  Negative for appetite change, fatigue, fever and unexpected weight change.   HENT:  Negative for sore throat and trouble swallowing.    Eyes: Negative.    Respiratory:  Negative for cough, shortness of breath and wheezing.    Cardiovascular:  Negative for chest pain and leg swelling.   Gastrointestinal:  Positive for abdominal pain (Mild to moderate in epigastric area). Negative for abdominal distention, blood in stool, constipation,  diarrhea, nausea and vomiting.   Endocrine: Negative.    Genitourinary: Negative.    Musculoskeletal:  Negative for back pain.   Skin: Negative.  Negative for rash.   Allergic/Immunologic: Negative.    Neurological: Negative.    Hematological: Negative.    Psychiatric/Behavioral:  Negative for confusion.    Objective:     Vital Signs (Most Recent):  Temp: 97.5 °F (36.4 °C) (10/30/22 1100)  Pulse: 70 (10/30/22 1230)  Resp: 20 (10/30/22 1230)  BP: 136/67 (10/30/22 1230)  SpO2: 96 % (10/30/22 1230) Vital Signs (24h Range):  Temp:  [97.5 °F (36.4 °C)-98.2 °F (36.8 °C)] 97.5 °F (36.4 °C)  Pulse:  [] 70  Resp:  [18-37] 20  SpO2:  [93 %-100 %] 96 %  BP: (100-231)/() 136/67     Weight: 50 kg (110 lb 3.7 oz)  Body mass index is 18.92 kg/m².    Physical Exam  Vitals and nursing note reviewed.   Constitutional:       Appearance: She is well-developed.   HENT:      Head: Normocephalic and atraumatic.   Cardiovascular:      Rate and Rhythm: Normal rate.      Heart sounds: Normal heart sounds.   Pulmonary:      Effort: Pulmonary effort is normal.   Abdominal:      General: Bowel sounds are normal. There is no distension.      Palpations: Abdomen is soft.      Tenderness: There is abdominal tenderness (mild in epigastric area).   Musculoskeletal:         General: Normal range of motion.      Cervical back: Normal range of motion.   Skin:     General: Skin is warm and dry.      Capillary Refill: Capillary refill takes less than 2 seconds.   Neurological:      Mental Status: She is alert and oriented to person, place, and time.   Psychiatric:         Behavior: Behavior normal.       Significant Labs:  I have reviewed all pertinent lab results within the past 24 hours.  CBC:   Recent Labs   Lab 10/30/22  0744   WBC 11.26   RBC 2.72*   HGB 8.8*   HCT 27.1*      *   MCH 32.4*   MCHC 32.5     CMP:   Recent Labs   Lab 10/30/22  0744   *   CALCIUM 9.1   ALBUMIN 2.4*   PROT 5.2*      K 4.0   CO2 28    CL 96   BUN 59*   CREATININE 4.8*   ALKPHOS 219*   *   *   BILITOT 3.0*       Significant Diagnostics:  Ultrasound    Gallstones.  Thick-walled gallbladder.  Positive sonographic Sloan sign.  Abdominal ascites.  The possibility of acute cholecystitis should be considered.     1.3 x 0.8 x 1.3 cm left hepatic lesion with minimal peripheral blood flow.  Recommend correlation with MRI of the abdomen from 12/28/2020      Assessment/Plan:     Transaminitis  Possibly due to gallstones or possibly intrinsic liver disease.    Will get HIDA scan to evaluate.    Patient unable MRCP due to having an AICD in place.            Easton Thomason MD  General Surgery  [facility] [place] [facility]

## 2022-11-15 NOTE — PATIENT DISCUSSION
Continue on RegenerEyes TID and Omega 3s (De3) TID X 1 M then if doing well can drop to BID. Mild vision blur secondary to AUBREY and mono. If persists, patient to call and come in for possible refraction for rx for PRN use.

## 2023-08-17 ENCOUNTER — COMPREHENSIVE EXAM (OUTPATIENT)
Dept: URBAN - METROPOLITAN AREA CLINIC 39 | Facility: CLINIC | Age: 78
End: 2023-08-17

## 2023-08-17 DIAGNOSIS — H52.223: ICD-10-CM

## 2023-08-17 DIAGNOSIS — H04.123: ICD-10-CM

## 2023-08-17 DIAGNOSIS — H43.391: ICD-10-CM

## 2023-08-17 DIAGNOSIS — H35.371: ICD-10-CM

## 2023-08-17 DIAGNOSIS — H43.822: ICD-10-CM

## 2023-08-17 DIAGNOSIS — H52.13: ICD-10-CM

## 2023-08-17 DIAGNOSIS — H18.593: ICD-10-CM

## 2023-08-17 DIAGNOSIS — H43.811: ICD-10-CM

## 2023-08-17 DIAGNOSIS — H35.363: ICD-10-CM

## 2023-08-17 PROCEDURE — 92014 COMPRE OPH EXAM EST PT 1/>: CPT

## 2023-08-17 PROCEDURE — 92250E RETINAL SCREENING, ELECTIVE

## 2023-08-17 PROCEDURE — 92015 DETERMINE REFRACTIVE STATE: CPT

## 2023-08-17 ASSESSMENT — VISUAL ACUITY
OD_CC: J1+
OS_SC: J3
OS_CC: J1+
OU_SC: J3
OU_SC: 20/20
OD_SC: 20/20
OU_CC: J1+
OS_SC: 20/20
OD_SC: J3

## 2023-08-17 ASSESSMENT — KERATOMETRY
OD_AXISANGLE_DEGREES: 177
OS_K1POWER_DIOPTERS: 43.75
OS_AXISANGLE_DEGREES: 1
OS_AXISANGLE2_DEGREES: 91
OD_K2POWER_DIOPTERS: 45.50
OD_AXISANGLE2_DEGREES: 87
OS_K2POWER_DIOPTERS: 47.50
OD_K1POWER_DIOPTERS: 44.75

## 2023-08-17 ASSESSMENT — TONOMETRY
OD_IOP_MMHG: 9
OS_IOP_MMHG: 9

## 2024-09-24 ENCOUNTER — COMPREHENSIVE EXAM (OUTPATIENT)
Dept: URBAN - METROPOLITAN AREA CLINIC 39 | Facility: CLINIC | Age: 79
End: 2024-09-24

## 2024-09-24 DIAGNOSIS — H43.391: ICD-10-CM

## 2024-09-24 DIAGNOSIS — H43.811: ICD-10-CM

## 2024-09-24 DIAGNOSIS — H35.371: ICD-10-CM

## 2024-09-24 DIAGNOSIS — H35.363: ICD-10-CM

## 2024-09-24 DIAGNOSIS — H18.593: ICD-10-CM

## 2024-09-24 DIAGNOSIS — H04.123: ICD-10-CM

## 2024-09-24 DIAGNOSIS — H52.223: ICD-10-CM

## 2024-09-24 DIAGNOSIS — H43.822: ICD-10-CM

## 2024-09-24 DIAGNOSIS — H52.13: ICD-10-CM

## 2024-09-24 PROCEDURE — 92015 DETERMINE REFRACTIVE STATE: CPT

## 2024-09-24 PROCEDURE — 92250E RETINAL SCREENING, ELECTIVE

## 2024-09-24 PROCEDURE — 92014 COMPRE OPH EXAM EST PT 1/>: CPT
